# Patient Record
Sex: FEMALE | Employment: OTHER | URBAN - METROPOLITAN AREA
[De-identification: names, ages, dates, MRNs, and addresses within clinical notes are randomized per-mention and may not be internally consistent; named-entity substitution may affect disease eponyms.]

---

## 2018-10-21 ENCOUNTER — OFFICE VISIT (OUTPATIENT)
Dept: URGENT CARE | Facility: CLINIC | Age: 48
End: 2018-10-21
Payer: COMMERCIAL

## 2018-10-21 VITALS
SYSTOLIC BLOOD PRESSURE: 127 MMHG | OXYGEN SATURATION: 99 % | DIASTOLIC BLOOD PRESSURE: 83 MMHG | HEIGHT: 66 IN | RESPIRATION RATE: 16 BRPM | WEIGHT: 145 LBS | TEMPERATURE: 97 F | BODY MASS INDEX: 23.3 KG/M2 | HEART RATE: 63 BPM

## 2018-10-21 DIAGNOSIS — S90.521A BLISTER OF RIGHT ANKLE, INITIAL ENCOUNTER: Primary | ICD-10-CM

## 2018-10-21 PROCEDURE — 3008F BODY MASS INDEX DOCD: CPT | Mod: CPTII,S$GLB,, | Performed by: NURSE PRACTITIONER

## 2018-10-21 PROCEDURE — 99203 OFFICE O/P NEW LOW 30 MIN: CPT | Mod: 25,S$GLB,, | Performed by: NURSE PRACTITIONER

## 2018-10-21 PROCEDURE — 10060 I&D ABSCESS SIMPLE/SINGLE: CPT | Mod: S$GLB,,, | Performed by: NURSE PRACTITIONER

## 2018-10-21 RX ORDER — MUPIROCIN 20 MG/G
OINTMENT TOPICAL
Qty: 22 G | Refills: 0 | Status: SHIPPED | OUTPATIENT
Start: 2018-10-21

## 2018-10-21 NOTE — PATIENT INSTRUCTIONS
Blister (Adult)  A blister is a raised area of skin with clear, watery fluid inside. A blister can occur when the skin is damaged. Blisters can hurt when they are pressed, or if they break open.  Blisters can be caused in many ways. This can happen if the skin is rubbed too hard or often. Or they can occur if the skin is hurt by the sun, a virus, or even a medicine.  Most blisters need little treatment. They often dry up and go away in a few days to weeks after the cause is stopped. A blister may need to be cleaned. A broken (open) blister may be bandaged to prevent infection. Blisters caused by insect bites or drug reactions may be more serious. These should be looked at by a healthcare provider.  Home care  Your healthcare provider may prescribe pain medicine. He or she may also prescribe an antibiotic cream or ointment to put on an open blister. Follow all instructions when using these medicines.  General care:  · Follow all instructions on how to care for the blister. If a bandage was put on, change the bandage as instructed. .  · If the blister breaks, the area will leak a clear fluid for a day or 2. Wash the area with soap and water every day or as advised by your healthcare provider.  · You may use over-the-counter pain medicines to control pain, unless another medicine was prescribed. If you have chronic liver or kidney disease, talk with your healthcare provider before using these medicines. Also talk with your provider if you've had a stomach ulcer or gastrointestinal bleeding.  Follow-up care  Follow up with your healthcare provider, or as advised.  When to seek medical advice  Call your healthcare provider right away if any of these occur:  · Fever of 100.4°F (38°C) or higher  · Redness or swelling that is new or gets worse  · Foul-smelling fluid leaking from the blister  · Pain doesnt go away, or gets worse  · Increase in size of the blister  · Blister doesnt get better after several days  Date Last  Reviewed: 9/1/2016  © 5033-0490 [employer]. [address] [place] [address], [place] [address]. All rights reserved. This information is not intended as a substitute for professional medical care. Always follow your healthcare professional's instructions.

## 2018-10-21 NOTE — PROGRESS NOTES
"Subjective:       Patient ID: Rose Mary Khan is a 48 y.o. female.    Vitals:  height is 5' 6" (1.676 m) and weight is 65.8 kg (145 lb). Her temperature is 96.9 °F (36.1 °C). Her blood pressure is 127/83 and her pulse is 63. Her respiration is 16 and oxygen saturation is 99%.     Chief Complaint: Blister (blood blister right heel)    Patient here for blood blister on the back of her right heel after wearing ill-fitting shoes. Blister appeared on Wed. And got bigger since then. Has used neosporin on blister. Blister is currently pussing and bleeding.       Rash   This is a new problem. The current episode started in the past 7 days. The problem has been gradually worsening since onset. The affected locations include the right ankle (Right heel). The rash is characterized by blistering and draining. She was exposed to nothing. Pertinent negatives include no anorexia, congestion, cough, diarrhea, eye pain, facial edema, fatigue, fever, joint pain, nail changes, rhinorrhea, shortness of breath, sore throat or vomiting. Treatments tried: neosporin. The treatment provided no relief.     Review of Systems   Constitution: Negative for chills, fatigue and fever.   HENT: Negative for congestion, rhinorrhea and sore throat.    Eyes: Negative for blurred vision and pain.   Cardiovascular: Negative for chest pain.   Respiratory: Negative for cough and shortness of breath.    Skin: Positive for rash. Negative for nail changes.        Blood blister   Musculoskeletal: Negative for back pain and joint pain.   Gastrointestinal: Negative for abdominal pain, anorexia, diarrhea, nausea and vomiting.   Neurological: Negative for headaches.   Psychiatric/Behavioral: The patient is not nervous/anxious.    All other systems reviewed and are negative.      Objective:      Physical Exam   Constitutional: She is oriented to person, place, and time. She appears well-developed and well-nourished.   HENT:   Head: Normocephalic and atraumatic. " Head is without abrasion, without contusion and without laceration.   Right Ear: External ear normal.   Left Ear: External ear normal.   Nose: Nose normal.   Eyes: Conjunctivae, EOM and lids are normal. Pupils are equal, round, and reactive to light.   Neck: Trachea normal, full passive range of motion without pain and phonation normal. Neck supple.   Cardiovascular: Normal rate, regular rhythm and normal heart sounds.   Pulmonary/Chest: Effort normal and breath sounds normal. No stridor. No respiratory distress.   Musculoskeletal: Normal range of motion.   Neurological: She is alert and oriented to person, place, and time.   Skin: Skin is warm, dry and intact. Capillary refill takes less than 2 seconds. Lesion noted. No abrasion, no bruising, no burn, no ecchymosis, no laceration and no rash noted. No erythema.        2 cm fluid filled blister to posterior right ankle   Psychiatric: She has a normal mood and affect. Her speech is normal and behavior is normal. Judgment and thought content normal. Cognition and memory are normal.   Nursing note and vitals reviewed.      Assessment:       1. Blister of right ankle, initial encounter        Plan:         Blister of right ankle, initial encounter  -     Incision & Drainage  -     mupirocin (BACTROBAN) 2 % ointment; Apply to affected area 3 times daily  Dispense: 22 g; Refill: 0      Patient Instructions     Blister (Adult)  A blister is a raised area of skin with clear, watery fluid inside. A blister can occur when the skin is damaged. Blisters can hurt when they are pressed, or if they break open.  Blisters can be caused in many ways. This can happen if the skin is rubbed too hard or often. Or they can occur if the skin is hurt by the sun, a virus, or even a medicine.  Most blisters need little treatment. They often dry up and go away in a few days to weeks after the cause is stopped. A blister may need to be cleaned. A broken (open) blister may be bandaged to prevent  infection. Blisters caused by insect bites or drug reactions may be more serious. These should be looked at by a healthcare provider.  Home care  Your healthcare provider may prescribe pain medicine. He or she may also prescribe an antibiotic cream or ointment to put on an open blister. Follow all instructions when using these medicines.  General care:  · Follow all instructions on how to care for the blister. If a bandage was put on, change the bandage as instructed. .  · If the blister breaks, the area will leak a clear fluid for a day or 2. Wash the area with soap and water every day or as advised by your healthcare provider.  · You may use over-the-counter pain medicines to control pain, unless another medicine was prescribed. If you have chronic liver or kidney disease, talk with your healthcare provider before using these medicines. Also talk with your provider if you've had a stomach ulcer or gastrointestinal bleeding.  Follow-up care  Follow up with your healthcare provider, or as advised.  When to seek medical advice  Call your healthcare provider right away if any of these occur:  · Fever of 100.4°F (38°C) or higher  · Redness or swelling that is new or gets worse  · Foul-smelling fluid leaking from the blister  · Pain doesnt go away, or gets worse  · Increase in size of the blister  · Blister doesnt get better after several days  Date Last Reviewed: 9/1/2016  © 8887-0124 [employer]. [address] [place] [address], [place] [address]. All rights reserved. This information is not intended as a substitute for professional medical care. Always follow your healthcare professional's instructions.

## 2018-10-21 NOTE — PROCEDURES
"Incision & Drainage  Date/Time: 10/21/2018 12:55 PM  Performed by: Trevor Hernandez NP  Authorized by: Trevor Hernandez NP     Time out: Immediately prior to procedure a "time out" was called to verify the correct patient, procedure, equipment, support staff and site/side marked as required.    Consent Done?:  Yes (Verbal)    Type:  Bulla  Body area:  Lower extremity  Location details:  Right ankle  Anesthesia method: none.  Scalpel size: 18# needle.  Complexity:  Simple  Drainage:  Serosanguinous and pus  Drainage amount:  Moderate  Wound treatment:  Wound left open, drainage and expression of material  Patient tolerance:  Patient tolerated the procedure well with no immediate complications    Wound dressed with Bactroban and non-adherent dressing      "

## 2018-10-24 ENCOUNTER — TELEPHONE (OUTPATIENT)
Dept: URGENT CARE | Facility: CLINIC | Age: 48
End: 2018-10-24

## 2022-11-16 ENCOUNTER — IMMUNIZATION (OUTPATIENT)
Dept: PRIMARY CARE CLINIC | Facility: CLINIC | Age: 52
End: 2022-11-16
Payer: COMMERCIAL

## 2022-11-16 DIAGNOSIS — Z23 NEED FOR VACCINATION: Primary | ICD-10-CM

## 2022-11-16 PROCEDURE — 0134A COVID-19, MRNA, LNP-S, BIVALENT BOOSTER, PF, 50 MCG/0.5 ML: CPT | Mod: CV19,PBBFAC | Performed by: INTERNAL MEDICINE

## 2022-11-16 PROCEDURE — 91313 COVID-19, MRNA, LNP-S, BIVALENT BOOSTER, PF, 50 MCG/0.5 ML: CPT | Mod: PBBFAC | Performed by: INTERNAL MEDICINE

## 2023-06-18 ENCOUNTER — OFFICE VISIT (OUTPATIENT)
Dept: URGENT CARE | Facility: CLINIC | Age: 53
End: 2023-06-18
Payer: COMMERCIAL

## 2023-06-18 VITALS
HEIGHT: 66 IN | RESPIRATION RATE: 19 BRPM | DIASTOLIC BLOOD PRESSURE: 86 MMHG | WEIGHT: 145.06 LBS | TEMPERATURE: 97 F | SYSTOLIC BLOOD PRESSURE: 158 MMHG | OXYGEN SATURATION: 97 % | BODY MASS INDEX: 23.31 KG/M2 | HEART RATE: 72 BPM

## 2023-06-18 DIAGNOSIS — S01.81XA FOREHEAD LACERATION, INITIAL ENCOUNTER: ICD-10-CM

## 2023-06-18 DIAGNOSIS — F07.81 POST CONCUSSIVE SYNDROME: ICD-10-CM

## 2023-06-18 DIAGNOSIS — Z23 NEED FOR TDAP VACCINATION: ICD-10-CM

## 2023-06-18 DIAGNOSIS — S09.90XA TRAUMATIC INJURY OF HEAD, INITIAL ENCOUNTER: Primary | ICD-10-CM

## 2023-06-18 PROCEDURE — 90471 IMMUNIZATION ADMIN: CPT | Mod: S$GLB,,, | Performed by: PHYSICIAN ASSISTANT

## 2023-06-18 PROCEDURE — 90471 TDAP VACCINE GREATER THAN OR EQUAL TO 7YO IM: ICD-10-PCS | Mod: S$GLB,,, | Performed by: PHYSICIAN ASSISTANT

## 2023-06-18 PROCEDURE — 99204 PR OFFICE/OUTPT VISIT, NEW, LEVL IV, 45-59 MIN: ICD-10-PCS | Mod: 25,S$GLB,, | Performed by: PHYSICIAN ASSISTANT

## 2023-06-18 PROCEDURE — 90715 TDAP VACCINE GREATER THAN OR EQUAL TO 7YO IM: ICD-10-PCS | Mod: S$GLB,,, | Performed by: PHYSICIAN ASSISTANT

## 2023-06-18 PROCEDURE — 12052 LACERATION REPAIR: ICD-10-PCS | Mod: S$GLB,,, | Performed by: PHYSICIAN ASSISTANT

## 2023-06-18 PROCEDURE — 99204 OFFICE O/P NEW MOD 45 MIN: CPT | Mod: 25,S$GLB,, | Performed by: PHYSICIAN ASSISTANT

## 2023-06-18 PROCEDURE — 12052 INTMD RPR FACE/MM 2.6-5.0 CM: CPT | Mod: S$GLB,,, | Performed by: PHYSICIAN ASSISTANT

## 2023-06-18 PROCEDURE — 90715 TDAP VACCINE 7 YRS/> IM: CPT | Mod: S$GLB,,, | Performed by: PHYSICIAN ASSISTANT

## 2023-06-18 RX ORDER — PROGESTERONE 200 MG/1
200 CAPSULE ORAL NIGHTLY
COMMUNITY
Start: 2023-06-08

## 2023-06-18 RX ORDER — MUPIROCIN 20 MG/G
OINTMENT TOPICAL 2 TIMES DAILY
Qty: 22 G | Refills: 0 | Status: SHIPPED | OUTPATIENT
Start: 2023-06-18 | End: 2023-06-25

## 2023-06-18 RX ORDER — GABAPENTIN 300 MG/1
CAPSULE ORAL
COMMUNITY
Start: 2023-05-17

## 2023-06-18 RX ORDER — MELOXICAM 15 MG/1
15 TABLET ORAL
COMMUNITY
Start: 2023-06-05

## 2023-06-18 NOTE — PROGRESS NOTES
"Subjective:      Patient ID: Rose Mary Khan is a 52 y.o. female.    Vitals:  height is 5' 6" (1.676 m) and weight is 65.8 kg (145 lb 1 oz). Her temperature is 97.1 °F (36.2 °C). Her blood pressure is 158/86 (abnormal) and her pulse is 72. Her respiration is 19 and oxygen saturation is 97%.     Chief Complaint: Head Injury    52-year-old female with a history of sciatica on meloxicam/gabapentin daily who presents to urgent care clinic with her  for evaluation.  About 1-1/2 hour prior to clinic arrival, she tripped forward hitting her head onto the wall.  Did not have loss of consciousness at that time.  She did felt headache, disoriented, numbness and tingling on left side of face radiating to jaw.  Took her home medication meloxicam/gabapentin in the morning prior to injury.  She does also have laceration over left forehead.  She thinks he maybe up-to-date with tetanus in the last 5-7 years but unsure.  No other associated symptoms.  No vision loss, gait instability, difficulty with speech, focal weakness/deficits, neck pain, chest pain, palpitation, dental pain, or any other associated symptoms.    She does not feel danger at homes and no concern abuse.     Medical assistant note:  Patient present with laceration to forehead on left side from hitting the wall after her foot stumbled moving a planter pot.  The pain is radiates into her jaw and she feeling some numbness     Head Injury   The incident occurred less than 1 hour ago. The injury mechanism was a fall. The volume of blood lost was minimal. The quality of the pain is described as aching, pulsating and throbbing. The pain is at a severity of 5/10. The pain is moderate. The pain has been constant since the injury. Associated symptoms include disorientation, headaches and numbness. Pertinent negatives include no blurred vision, memory loss, tinnitus, vomiting or weakness. She has tried nothing for the symptoms. The treatment provided no relief. "     Constitution: Negative for activity change, appetite change, chills, sweating, fatigue, fever and generalized weakness.   HENT:  Positive for facial trauma. Negative for ear pain, tinnitus, hearing loss, facial swelling, congestion, postnasal drip, sinus pain, sinus pressure, sore throat, trouble swallowing and voice change.    Neck: Negative for neck pain, neck stiffness and painful lymph nodes.   Cardiovascular:  Negative for chest pain, leg swelling, palpitations, sob on exertion and passing out.   Eyes:  Negative for eye discharge, eye pain, photophobia, vision loss, double vision and blurred vision.   Respiratory:  Negative for chest tightness, cough, sputum production, bloody sputum, COPD, shortness of breath, stridor, wheezing and asthma.    Gastrointestinal:  Negative for abdominal pain, nausea, vomiting, constipation, diarrhea, bright red blood in stool, rectal bleeding, heartburn and bowel incontinence.   Genitourinary:  Negative for dysuria, frequency, urgency, urine decreased, flank pain, bladder incontinence and hematuria.   Musculoskeletal:  Positive for trauma. Negative for joint pain, joint swelling, abnormal ROM of joint, muscle cramps and muscle ache.   Skin:  Positive for laceration. Negative for color change, pale, rash and wound.   Allergic/Immunologic: Negative for seasonal allergies, asthma and immunocompromised state.   Neurological:  Positive for headaches, disorientation and numbness. Negative for dizziness, history of vertigo, light-headedness, passing out, facial drooping, speech difficulty, coordination disturbances, loss of balance, altered mental status, loss of consciousness, tingling and seizures.   Hematologic/Lymphatic: Negative for swollen lymph nodes, easy bruising/bleeding and trouble clotting. Does not bruise/bleed easily.   Psychiatric/Behavioral:  Positive for disorientation. Negative for altered mental status.       History reviewed. No pertinent past medical  history.    Objective:     Physical Exam   Constitutional: She is oriented to person, place, and time. She appears well-developed. She is cooperative. She does not appear ill. No distress.      Comments:Well-appearing     HENT:   Head: Normocephalic.      Comments: Left forehead at hairline foreign 5 cm deep laceration with active bleeding and tenderness to palpation.  No bony step-off or crepitus present.  Ears:   Right Ear: Hearing, external ear and ear canal normal. No no drainage, swelling or tenderness. No mastoid tenderness.   Left Ear: Hearing, external ear and ear canal normal. No no drainage, swelling or tenderness. No mastoid tenderness.      Comments: No hematotympanum present  Nose: Nose normal. Right sinus exhibits no maxillary sinus tenderness and no frontal sinus tenderness. Left sinus exhibits no maxillary sinus tenderness and no frontal sinus tenderness.   Mouth/Throat: Uvula is midline, oropharynx is clear and moist and mucous membranes are normal. Mucous membranes are moist. No oral lesions. No trismus in the jaw. No uvula swelling. No oropharyngeal exudate, posterior oropharyngeal edema or posterior oropharyngeal erythema. No tonsillar exudate. Oropharynx is clear.   Eyes: Conjunctivae, EOM and lids are normal. Right eye exhibits no discharge. Left eye exhibits no discharge. Right conjunctiva is not injected. Right conjunctiva has no hemorrhage. Left conjunctiva is not injected. Left conjunctiva has no hemorrhage. Extraocular movement intact vision grossly intact gaze aligned appropriately   Neck: Phonation normal. Neck supple. No neck rigidity present.   Cardiovascular: Normal rate, regular rhythm, normal heart sounds and normal pulses.   No murmur heard.  Pulmonary/Chest: Effort normal and breath sounds normal. No accessory muscle usage. No respiratory distress. She has no wheezes. She exhibits no tenderness.   Abdominal: Normal appearance. She exhibits no distension. Soft. There is no  abdominal tenderness. There is no rebound and no guarding.   Musculoskeletal: Normal range of motion.         General: No tenderness. Normal range of motion.      Cervical back: She exhibits no tenderness.      Comments: Moves all extremities with normal tone, strength, and ROM.  Gait normal.    No midline or paraspinal tenderness to palpation.  No bony step-off or crepitus present.   Lymphadenopathy:     She has no cervical adenopathy.   Neurological: no focal deficit. She is alert, oriented to person, place, and time and at baseline. She has normal motor skills and normal sensation. She displays facial symmetry and no dysarthria. She exhibits normal muscle tone. Gait and coordination normal. Coordination normal. GCS eye subscore is 4. GCS verbal subscore is 5. GCS motor subscore is 6.      Comments: Neurological  GCS: Motor: 6/Verbal: 5/Eyes: 4 GCS Total: 15  Mental Status: Awake, Alert, Oriented x 4  Follows commands   Head: normocephalic, left forehead/hairline laceration  PERRL, EOMI,   Facial expression symmetric, tongue midline, shoulder shrug symmetric  Moves all extremities with good strength 5/5  No pronator drift or dysmetria.  Toe to shin maneuver normal.   Normal gait. Normal tandem gait.  No aphasia or dysarthria       Skin: Skin is warm, dry and no rash. Capillary refill takes less than 2 seconds.   Psychiatric: She experiences Normal attention. Her speech is normal and behavior is normal. Thought content normal.   Nursing note and vitals reviewed.          Assessment:     1. Traumatic injury of head, initial encounter    2. Forehead laceration, initial encounter    3. Post concussive syndrome    4. Need for Tdap vaccination      Patient presents with complex deep left forehead/scalp laceration due to head trauma with no loss of consciousness.  Patient is not up-to-date with tetanus.  Tdap given clinic It was cleaned and closed with 5 simple nylon sutures.  Patient tolerated well.      No focal  weakness/paresthesias on exam.  Neurologically intact on exam.    Patient was given topical Bactroban and wound care instructions verbally/printed. patient was recommended activity modification, rice therapy OTC pain relievers p.r.n.. Patient will return to clinic and 12-14 days for suture removal.    Strict wound care and head injury monitoring precautions given to patient and .    Strict clinic vs. ER precautions given.    Patient verbalized understanding and agreed with plan of care.    Note dictated with voice recognition software, please excuse any grammatical errors.    Plan:       Traumatic injury of head, initial encounter    Forehead laceration, initial encounter  -     (In Office Administered) Tdap Vaccine  -     mupirocin (BACTROBAN) 2 % ointment; Apply topically 2 (two) times daily. for 7 days  Dispense: 22 g; Refill: 0    Post concussive syndrome    Need for Tdap vaccination  -     (In Office Administered) Tdap Vaccine             Additional MDM:     Heart Failure Score:   COPD = No    Patient Instructions   PLEASE READ YOUR DISCHARGE INSTRUCTIONS ENTIRELY AS IT CONTAINS IMPORTANT INFORMATION.    **Please cover your incision for 48-72 hours.  Then keep open to air.  Use the antibiotic ointment to the wound 2x to open wound as directed for 1-2 weeks.     Do not submerge or go swimming for 1-2 weeks until wound is fully healed. AND ALL SUTURES MUST BE REMOVED. Ok to shower and get wet after 72 hours; do not scrub wound. Do not use any hydrogen peroxide or alcohol to clean as it will prevent wound healing.    Return to this clinic for suture removal with me on 7/2/2023 or [facility] location on 7/1/2023 at day 12-14 from suture placement.     Take OTC Tylenol or anti-inflammatory as needed for pain. If no contraindication.    May apply ice compression to help with pain or swelling.     Please return or see your primary care doctor for signs of worsening infection (purulent drainage, fever, worsening  swelling/redness/pain, inability to move your extremity).        Please arrange follow up with your primary medical clinic as soon as possible. You must understand that you've received an Urgent Care treatment only and that you may be released before all of your medical problems are known or treated. You, the patient, will arrange for follow up as instructed. If your symptoms worsen or fail to improve you should go to the Emergency Room.    WE CANNOT RULE OUT ALL POSSIBLE CAUSES OF YOUR SYMPTOMS IN THE URGENT CARE SETTING PLEASE GO TO THE ER IF YOU FEELS YOUR CONDITION IS WORSENING OR YOU WOULD LIKE EMERGENT EVALUATION.      Follow-up care  Follow up with your healthcare provider as advised. If you have stitches or staples, be sure to return as directed to have them removed.    When to seek medical advice  Call your healthcare provider right away if any of these occur:  Wound bleeding not controlled by direct pressure  Signs of infection, including increasing pain in the wound, increasing wound redness or swelling, or pus or bad odor coming from the wound  Fever of 100.4°F (38.ºC) or as directed by your health care provider  Stitches or staples come apart or fall out or surgical tape falls off before 7 days  Wound edges re-open  Wound changes colors  Numbness or weakness in the affected hand   Decreased movement of the hand  Date Last Reviewed: 6/10/2015  © 7151-1177 [employer]. [address] [place] [address], [place] [address]. All rights reserved. This information is not intended as a substitute for professional medical care. Always follow your healthcare professional's instructions.

## 2023-06-18 NOTE — PATIENT INSTRUCTIONS
PLEASE READ YOUR DISCHARGE INSTRUCTIONS ENTIRELY AS IT CONTAINS IMPORTANT INFORMATION.    **Please cover your incision for 48-72 hours.  Then keep open to air.  Use the antibiotic ointment to the wound 2x to open wound as directed for 1-2 weeks.     Do not submerge or go swimming for 1-2 weeks until wound is fully healed. AND ALL SUTURES MUST BE REMOVED. Ok to shower and get wet after 72 hours; do not scrub wound. Do not use any hydrogen peroxide or alcohol to clean as it will prevent wound healing.    Return to this clinic for suture removal with me on 7/2/2023 or [facility] on 7/1/2023 at day 12-14 from suture placement.     Take OTC Tylenol or anti-inflammatory as needed for pain. If no contraindication.    May apply ice compression to help with pain or swelling.     Please return or see your primary care doctor for signs of worsening infection (purulent drainage, fever, worsening swelling/redness/pain, inability to move your extremity).        Please arrange follow up with your primary medical clinic as soon as possible. You must understand that you've received an Urgent Care treatment only and that you may be released before all of your medical problems are known or treated. You, the patient, will arrange for follow up as instructed. If your symptoms worsen or fail to improve you should go to the Emergency Room.    WE CANNOT RULE OUT ALL POSSIBLE CAUSES OF YOUR SYMPTOMS IN THE URGENT CARE SETTING PLEASE GO TO THE ER IF YOU FEELS YOUR CONDITION IS WORSENING OR YOU WOULD LIKE EMERGENT EVALUATION.      Follow-up care  Follow up with your healthcare provider as advised. If you have stitches or staples, be sure to return as directed to have them removed.    When to seek medical advice  Call your healthcare provider right away if any of these occur:  Wound bleeding not controlled by direct pressure  Signs of infection, including increasing pain in the wound, increasing wound redness or swelling, or pus or bad  odor coming from the wound  Fever of 100.4°F (38.ºC) or as directed by your health care provider  Stitches or staples come apart or fall out or surgical tape falls off before 7 days  Wound edges re-open  Wound changes colors  Numbness or weakness in the affected hand   Decreased movement of the hand  Date Last Reviewed: 6/10/2015  © 2136-8622 [employer]. [address] [place] [address]. All rights reserved. This information is not intended as a substitute for professional medical care. Always follow your healthcare professional's instructions.